# Patient Record
(demographics unavailable — no encounter records)

---

## 2025-04-23 NOTE — END OF VISIT
[Time Spent: ___ minutes] : I have spent [unfilled] minutes of time on the encounter which excludes teaching and separately reported services. Passed

## 2025-04-24 NOTE — DATA REVIEWED
[FreeTextEntry1] : Review of Laboratory Evaluation 02/2025 Non-fasting CMP: BG 96, no transaminitis LIpid Panel: , HDL 41, LdL 102, -high HgA1C 5.2% fT4 1, TSH 6.50 (0.50-4.30)-high CBC unremarkable   03/09/2025 Fasting  TSH 3.430, fT4 1.13  Thyroglobulin Abs - 7.4 (0-0.9)-high anti-TPO - 190 (0-18)-high GGT 13 (0-65)  Hepatic function profile: ALT 62 (0-29)-high, AST 42 (0-40)-high Lipid Panel: , HDL 42, TG 77,  -high   Review of Growth Chart Height: most points between 25th and 50th percentile (at the 25th percentile at 7 y/o and closer to the 50th percentile by 10 y/o)  Weight: around the 25th percentile until from 2-4 y/o after 4 y/o weight started increasing reaching the 97th pecentile by 7 y/o with further increase above the 97th percentile

## 2025-04-24 NOTE — DATA REVIEWED
[FreeTextEntry1] : Review of Laboratory Evaluation 02/2025 Non-fasting CMP: BG 96, no transaminitis LIpid Panel: , HDL 41, LdL 102, -high HgA1C 5.2% fT4 1, TSH 6.50 (0.50-4.30)-high CBC unremarkable   03/09/2025 Fasting  TSH 3.430, fT4 1.13  Thyroglobulin Abs - 7.4 (0-0.9)-high anti-TPO - 190 (0-18)-high GGT 13 (0-65)  Hepatic function profile: ALT 62 (0-29)-high, AST 42 (0-40)-high Lipid Panel: , HDL 42, TG 77,  -high   Review of Growth Chart Height: most points between 25th and 50th percentile (at the 25th percentile at 9 y/o and closer to the 50th percentile by 10 y/o)  Weight: around the 25th percentile until from 2-6 y/o after 6 y/o weight started increasing reaching the 97th pecentile by 9 y/o with further increase above the 97th percentile

## 2025-04-24 NOTE — HISTORY OF PRESENT ILLNESS
[FreeTextEntry2] : 10 yr 3 months old male who is referred by his PMD, Dr. Marlow for evaluation of abnormal labs   Parents report that initial set of labs was done in 02/2025 during his well visit and TFTs, LFTs were abnormal so was repeated a month later in 03/2025.   Review of PMD's BW as follows  02/2025 Non-fasting CMP: BG 96, no transaminitis LIpid Panel: , HDL 41, LdL 102, -high HgA1C 5.2% fT4 1, TSH 6.50 (0.50-4.30)-high CBC unremarkable   03/09/2025 Fasting TSH 3.430, fT4 1.13  Thyroglobulin Abs - 7.4 (0-0.9)-high anti-TPO - 190 (0-18)-high GGT 13 (0-65)  Hepatic function profile: ALT 62 (0-29)-high, AST 42 (0-40)-high Lipid Panel: , HDL 42, TG 77,  -high   Eats large portion size (likes meat and carbs- potatoes, rice, pasta) , goes for seconds Breakfast: Bread with cheese or penutbutter Lunch: Bagel or bread with cheese  Snack: popcorn, olives, chocolate, cookies or pastry  Dinner: Chicken with carb (potato rice) Dessert: fruit  If still hungry: would eat cereal - corn flakes or rice crispies - whole milk or 1 % milk Does not do much physical activity except PT 2x/week due to in toeing and poor coordination   Tonsillectomy 2 years ago due to BRIDGETTE ---> now not snoring/breathing as heavily during sleep. Has not been back to ENT for revaluation

## 2025-04-24 NOTE — PAST MEDICAL HISTORY
[At Term] : at term [Normal Vaginal Route] : by normal vaginal route [None] : there were no delivery complications [Age Appropriate] : age appropriate developmental milestones met [FreeTextEntry1] : BW 6 lbs 11 oz, BL cannot recall

## 2025-04-24 NOTE — PHYSICAL EXAM
[Well Nourished] : well nourished [Interactive] : interactive [Obese] : obese [Normal Appearance] : normal appearance [Well formed] : well formed [Normal S1 and S2] : normal S1 and S2 [Clear to Ausculation Bilaterally] : clear to auscultation bilaterally [Abdomen Soft] : soft [Abdomen Tenderness] : non-tender [] : no hepatosplenomegaly [Normal] : normal  [Acanthosis Nigricans___] : no acanthosis nigricans [Goiter] : no goiter [Murmur] : no murmurs [de-identified] : Testes 5 cc b/l, no pubic hair, no axillary hair

## 2025-04-24 NOTE — CONSULT LETTER
[Dear  ___] : Dear  [unfilled], [Consult Letter:] : I had the pleasure of evaluating your patient, [unfilled]. [Please see my note below.] : Please see my note below. [Consult Closing:] : Thank you very much for allowing me to participate in the care of this patient.  If you have any questions, please do not hesitate to contact me. [Sincerely,] : Sincerely, [( Thank you for referring [unfilled] for consultation for _____ )] : Thank you for referring [unfilled] for consultation for [unfilled] [FreeTextEntry3] : Harleen Luque MD Pediatric Endocrinology [facility]

## 2025-04-24 NOTE — CONSULT LETTER
[Dear  ___] : Dear  [unfilled], [Consult Letter:] : I had the pleasure of evaluating your patient, [unfilled]. [Please see my note below.] : Please see my note below. [Consult Closing:] : Thank you very much for allowing me to participate in the care of this patient.  If you have any questions, please do not hesitate to contact me. [Sincerely,] : Sincerely, [( Thank you for referring [unfilled] for consultation for _____ )] : Thank you for referring [unfilled] for consultation for [unfilled] [FreeTextEntry3] : Harleen Luque MD Pediatric Endocrinology [facility]

## 2025-04-24 NOTE — ASSESSMENT
[FreeTextEntry1] : 10 year 3 monts old male with obesity and concern for positive thyroid antibodies on PMD's testing.  Clinically euthyroid  We discussed that this picture is consistent with Hashimoto's thyroiditis and need to monitor thyroid function over time as can deteriorate   Additionally has transaminitis and borderline elevated LDL cholesterol in the context of obesity.  Normal HgA1C Eats a very carb heavy diet, large portion sizes  Not physically active  Hashimoto's thyroiditis Discussed that Hashimoto's thyroiditis is an autoimmune conditions where a person's body forms antibodies against his/her own thyroid.   People with Hashimoto's thyroiditis have increased risk of developing abnormal thyroid function over time and thus thyroid function needs to be monitoring and appropriately treated with thyroid hormone replacement if it deteriorates  (however, many people with Hashimoto's thyroiditis remain euthyroid and not require treatment) To obtain repeat TFTs today ---> amy call parents with results ---> if normal will repeat in 3 months   Obesity -Discussed the importance of diet and lifestyle modifications  -Specific recommendations included portion control, reducing carb intake/added sugars and increasing physical activity (to start from 20-30 mins/day and increase gradually to 1 hour/day)  -Discussed comorbidities associated with obesity: including DM, fatty liver, HTN, BRIDGETTE -Recommended dietician evaluation - family is willing to see RD in our [facility] office- will ask  to schedule  Transaminitis -Discussed could be secondary to fatty liver -Will trend over time  Elevated LDL cholesterol Discussed to cut down on saturated an trans fats RD visit    RTC in 4 months

## 2025-04-24 NOTE — PHYSICAL EXAM
[Well Nourished] : well nourished [Interactive] : interactive [Obese] : obese [Normal Appearance] : normal appearance [Well formed] : well formed [Normal S1 and S2] : normal S1 and S2 [Clear to Ausculation Bilaterally] : clear to auscultation bilaterally [Abdomen Soft] : soft [Abdomen Tenderness] : non-tender [] : no hepatosplenomegaly [Normal] : normal  [Acanthosis Nigricans___] : no acanthosis nigricans [Goiter] : no goiter [Murmur] : no murmurs [de-identified] : Testes 5 cc b/l, no pubic hair, no axillary hair